# Patient Record
Sex: FEMALE | Race: WHITE | Employment: FULL TIME | URBAN - METROPOLITAN AREA
[De-identification: names, ages, dates, MRNs, and addresses within clinical notes are randomized per-mention and may not be internally consistent; named-entity substitution may affect disease eponyms.]

---

## 2017-01-24 ENCOUNTER — OFFICE VISIT (OUTPATIENT)
Dept: OBGYN CLINIC | Facility: CLINIC | Age: 27
End: 2017-01-24

## 2017-01-24 DIAGNOSIS — Z23 NEED FOR VACCINATION: Primary | ICD-10-CM

## 2017-01-24 PROCEDURE — 90651 9VHPV VACCINE 2/3 DOSE IM: CPT | Performed by: OBSTETRICS & GYNECOLOGY

## 2017-01-24 PROCEDURE — 90471 IMMUNIZATION ADMIN: CPT | Performed by: OBSTETRICS & GYNECOLOGY

## 2017-04-21 ENCOUNTER — OFFICE VISIT (OUTPATIENT)
Dept: FAMILY MEDICINE CLINIC | Facility: CLINIC | Age: 27
End: 2017-04-21

## 2017-04-21 VITALS
TEMPERATURE: 98 F | DIASTOLIC BLOOD PRESSURE: 78 MMHG | BODY MASS INDEX: 30 KG/M2 | SYSTOLIC BLOOD PRESSURE: 116 MMHG | RESPIRATION RATE: 14 BRPM | OXYGEN SATURATION: 98 % | HEART RATE: 82 BPM | WEIGHT: 180.38 LBS

## 2017-04-21 DIAGNOSIS — E66.9 NON MORBID OBESITY, UNSPECIFIED OBESITY TYPE: Primary | ICD-10-CM

## 2017-04-21 PROCEDURE — 99214 OFFICE O/P EST MOD 30 MIN: CPT | Performed by: FAMILY MEDICINE

## 2017-04-21 RX ORDER — PHENTERMINE HYDROCHLORIDE 37.5 MG/1
TABLET ORAL
Qty: 30 TABLET | Refills: 0 | Status: SHIPPED | OUTPATIENT
Start: 2017-04-21 | End: 2017-05-19

## 2017-04-21 NOTE — PROGRESS NOTES
CC:  Obesity    HPI:     Location abdomen and arms  Severity moderate  Duration chronic  Context worse since 27years of age    ROS;   GENERAL HEALTH: feels well otherwise  SKIN: denies any unusual skin lesions or rashes  RESPIRATORY: denies shortness of b

## 2017-05-05 ENCOUNTER — OFFICE VISIT (OUTPATIENT)
Dept: FAMILY MEDICINE CLINIC | Facility: CLINIC | Age: 27
End: 2017-05-05

## 2017-05-05 VITALS
DIASTOLIC BLOOD PRESSURE: 60 MMHG | RESPIRATION RATE: 16 BRPM | HEIGHT: 65 IN | HEART RATE: 84 BPM | OXYGEN SATURATION: 98 % | BODY MASS INDEX: 29.22 KG/M2 | TEMPERATURE: 98 F | WEIGHT: 175.38 LBS | SYSTOLIC BLOOD PRESSURE: 92 MMHG

## 2017-05-05 DIAGNOSIS — E66.9 NON MORBID OBESITY, UNSPECIFIED OBESITY TYPE: Primary | ICD-10-CM

## 2017-05-05 DIAGNOSIS — Z79.899 HIGH RISK MEDICATION USE: ICD-10-CM

## 2017-05-05 PROCEDURE — 99213 OFFICE O/P EST LOW 20 MIN: CPT | Performed by: FAMILY MEDICINE

## 2017-05-05 NOTE — PROGRESS NOTES
CC: medication follow up    HPI:     The patient has done well in first 2 weeks. Cravings and appetite reduced. Weight loss of 5 pounds.      ROS: No cp or sob, some initial nausea but resolved    EXAM:   BP 92/60 mmHg  Pulse 84  Temp(Src) 98.2 °F (36.8 °C)

## 2017-05-19 ENCOUNTER — OFFICE VISIT (OUTPATIENT)
Dept: FAMILY MEDICINE CLINIC | Facility: CLINIC | Age: 27
End: 2017-05-19

## 2017-05-19 VITALS
HEART RATE: 90 BPM | WEIGHT: 172.13 LBS | DIASTOLIC BLOOD PRESSURE: 60 MMHG | TEMPERATURE: 98 F | RESPIRATION RATE: 16 BRPM | HEIGHT: 65 IN | OXYGEN SATURATION: 96 % | BODY MASS INDEX: 28.68 KG/M2 | SYSTOLIC BLOOD PRESSURE: 100 MMHG

## 2017-05-19 DIAGNOSIS — E66.9 NON MORBID OBESITY, UNSPECIFIED OBESITY TYPE: Primary | ICD-10-CM

## 2017-05-19 PROCEDURE — 99213 OFFICE O/P EST LOW 20 MIN: CPT | Performed by: FAMILY MEDICINE

## 2017-05-19 RX ORDER — PHENTERMINE HYDROCHLORIDE 37.5 MG/1
TABLET ORAL
Qty: 30 TABLET | Refills: 0 | Status: SHIPPED | OUTPATIENT
Start: 2017-05-19 | End: 2017-06-16

## 2017-05-19 NOTE — PROGRESS NOTES
CC: meds check    HPI:     Feeling well. Losing weight. No palpitations.      ROS: no edema    EXAM:   /60 mmHg  Pulse 90  Temp(Src) 98.4 °F (36.9 °C) (Temporal)  Resp 16  Ht 65\"  Wt 172 lb 2 oz  BMI 28.64 kg/m2  SpO2 96%  LMP 04/13/2017    H: RRR

## 2017-05-26 ENCOUNTER — TELEPHONE (OUTPATIENT)
Dept: OBGYN CLINIC | Facility: CLINIC | Age: 27
End: 2017-05-26

## 2017-05-26 NOTE — TELEPHONE ENCOUNTER
Left message on patient's V/M stating she missed her gardasil shot today. To call and resched her appt.

## 2017-06-16 ENCOUNTER — OFFICE VISIT (OUTPATIENT)
Dept: FAMILY MEDICINE CLINIC | Facility: CLINIC | Age: 27
End: 2017-06-16

## 2017-06-16 VITALS
SYSTOLIC BLOOD PRESSURE: 127 MMHG | BODY MASS INDEX: 28.51 KG/M2 | HEIGHT: 64 IN | TEMPERATURE: 98 F | DIASTOLIC BLOOD PRESSURE: 68 MMHG | OXYGEN SATURATION: 98 % | HEART RATE: 72 BPM | WEIGHT: 167 LBS | RESPIRATION RATE: 14 BRPM

## 2017-06-16 DIAGNOSIS — E66.3 OVERWEIGHT: Primary | ICD-10-CM

## 2017-06-16 PROCEDURE — 99213 OFFICE O/P EST LOW 20 MIN: CPT | Performed by: FAMILY MEDICINE

## 2017-06-16 RX ORDER — PHENTERMINE HYDROCHLORIDE 37.5 MG/1
TABLET ORAL
Qty: 30 TABLET | Refills: 0 | Status: SHIPPED | OUTPATIENT
Start: 2017-06-16 | End: 2017-07-18

## 2017-06-16 NOTE — PROGRESS NOTES
CC: follow up weight loss    HPI:     Overall has lost 5 more pounds. Diet is good. Walking for exercise.      ROS: no cp or sob, no palpitations    EXAM:   /68 mmHg  Pulse 72  Temp(Src) 98.1 °F (36.7 °C) (Temporal)  Resp 14  Ht 64\"  Wt 167 lb  BMI 2

## 2017-07-18 ENCOUNTER — OFFICE VISIT (OUTPATIENT)
Dept: FAMILY MEDICINE CLINIC | Facility: CLINIC | Age: 27
End: 2017-07-18

## 2017-07-18 VITALS
BODY MASS INDEX: 27.19 KG/M2 | DIASTOLIC BLOOD PRESSURE: 60 MMHG | HEART RATE: 80 BPM | SYSTOLIC BLOOD PRESSURE: 102 MMHG | RESPIRATION RATE: 16 BRPM | WEIGHT: 163.19 LBS | TEMPERATURE: 98 F | HEIGHT: 65 IN

## 2017-07-18 DIAGNOSIS — E66.3 OVERWEIGHT: Primary | ICD-10-CM

## 2017-07-18 DIAGNOSIS — Z79.899 HIGH RISK MEDICATION USE: ICD-10-CM

## 2017-07-18 PROCEDURE — 99214 OFFICE O/P EST MOD 30 MIN: CPT | Performed by: FAMILY MEDICINE

## 2017-07-18 RX ORDER — PHENTERMINE HYDROCHLORIDE 37.5 MG/1
TABLET ORAL
Qty: 30 TABLET | Refills: 0 | Status: SHIPPED | OUTPATIENT
Start: 2017-07-18 | End: 2017-08-18

## 2017-07-18 NOTE — PROGRESS NOTES
CC: follow up     HPI:     Overall doing great. Exercising with walking and playing with the kids. Diet has not been the best but she is still losing weight. No negative side effects. Compliant. Weight loss of 4 pounds in 4 weeks.      ROS: no mood changes

## 2017-08-18 ENCOUNTER — OFFICE VISIT (OUTPATIENT)
Dept: FAMILY MEDICINE CLINIC | Facility: CLINIC | Age: 27
End: 2017-08-18

## 2017-08-18 VITALS
DIASTOLIC BLOOD PRESSURE: 64 MMHG | HEART RATE: 92 BPM | SYSTOLIC BLOOD PRESSURE: 110 MMHG | HEIGHT: 65 IN | WEIGHT: 159.25 LBS | OXYGEN SATURATION: 98 % | TEMPERATURE: 99 F | RESPIRATION RATE: 16 BRPM | BODY MASS INDEX: 26.53 KG/M2

## 2017-08-18 DIAGNOSIS — Z79.899 HIGH RISK MEDICATION USE: ICD-10-CM

## 2017-08-18 DIAGNOSIS — E66.3 OVERWEIGHT: Primary | ICD-10-CM

## 2017-08-18 PROCEDURE — 99214 OFFICE O/P EST MOD 30 MIN: CPT | Performed by: FAMILY MEDICINE

## 2017-08-18 RX ORDER — PHENTERMINE HYDROCHLORIDE 37.5 MG/1
TABLET ORAL
Qty: 30 TABLET | Refills: 0 | Status: SHIPPED | OUTPATIENT
Start: 2017-08-18 | End: 2017-09-29 | Stop reason: DRUGHIGH

## 2017-08-18 NOTE — PROGRESS NOTES
CC: follow up weight loss    HPI:     Overall she has continued to lose weight. The diet was not the best. Some mood fluctuations related primarily to home life situations. Is exercising by walking.  The overweight is a chronic issue, moderate in severity

## 2017-09-15 ENCOUNTER — OFFICE VISIT (OUTPATIENT)
Dept: FAMILY MEDICINE CLINIC | Facility: CLINIC | Age: 27
End: 2017-09-15

## 2017-09-15 VITALS
HEART RATE: 80 BPM | BODY MASS INDEX: 26 KG/M2 | RESPIRATION RATE: 16 BRPM | WEIGHT: 158 LBS | TEMPERATURE: 98 F | DIASTOLIC BLOOD PRESSURE: 62 MMHG | SYSTOLIC BLOOD PRESSURE: 104 MMHG

## 2017-09-15 DIAGNOSIS — Z79.899 HIGH RISK MEDICATION USE: ICD-10-CM

## 2017-09-15 DIAGNOSIS — E66.3 OVERWEIGHT: Primary | ICD-10-CM

## 2017-09-15 PROCEDURE — 99213 OFFICE O/P EST LOW 20 MIN: CPT | Performed by: FAMILY MEDICINE

## 2017-09-15 NOTE — PROGRESS NOTES
CC: follow up     HPI:     Overall the weight loss has really tapered off.      ROS: some mood irritability    EXAM:     /62   Pulse 80   Temp 98 °F (36.7 °C) (Temporal)   Resp 16   Wt 158 lb   LMP 08/19/2017 (Exact Date)   BMI 26.29 kg/m²     H: RRR

## 2017-09-29 ENCOUNTER — TELEPHONE (OUTPATIENT)
Dept: FAMILY MEDICINE CLINIC | Facility: CLINIC | Age: 27
End: 2017-09-29

## 2017-09-29 ENCOUNTER — OFFICE VISIT (OUTPATIENT)
Dept: FAMILY MEDICINE CLINIC | Facility: CLINIC | Age: 27
End: 2017-09-29

## 2017-09-29 VITALS
OXYGEN SATURATION: 98 % | TEMPERATURE: 98 F | RESPIRATION RATE: 12 BRPM | DIASTOLIC BLOOD PRESSURE: 54 MMHG | HEIGHT: 65 IN | HEART RATE: 85 BPM | WEIGHT: 157.25 LBS | SYSTOLIC BLOOD PRESSURE: 98 MMHG | BODY MASS INDEX: 26.2 KG/M2

## 2017-09-29 DIAGNOSIS — E66.3 OVERWEIGHT: Primary | ICD-10-CM

## 2017-09-29 DIAGNOSIS — Z79.899 HIGH RISK MEDICATION USE: ICD-10-CM

## 2017-09-29 PROCEDURE — 99213 OFFICE O/P EST LOW 20 MIN: CPT | Performed by: FAMILY MEDICINE

## 2017-09-29 NOTE — TELEPHONE ENCOUNTER
Pt advised and verbalized understanding. Patient states she will call when her medication is running out. Patient would like hard script for pharmacy.

## 2017-09-29 NOTE — PROGRESS NOTES
CC: follow up     HPI:     Overall the mood is better than previous. She did lose additional 1 pound in 2 weeks. Is getting more cravings. Is developing a strategy of meal planning. Discussed snacks.      ROS: no cp or sob    EXAM:   BP 98/54 (BP Location:

## 2017-09-29 NOTE — TELEPHONE ENCOUNTER
Pt was asked to find out if Phentermine HCl 8 MG Oral Tab is still available and it is not. And the phentermine 15Mg only coming in cap form.

## 2017-10-10 ENCOUNTER — TELEPHONE (OUTPATIENT)
Dept: FAMILY MEDICINE CLINIC | Facility: CLINIC | Age: 27
End: 2017-10-10

## 2017-10-10 NOTE — TELEPHONE ENCOUNTER
Patient informed rx ready for . Patient notified of clinic hours tomorrow. Patient verbalized understanding.

## 2017-10-10 NOTE — TELEPHONE ENCOUNTER
LOV: 9/29/17 for overweight    Phentermine HCl 15 MG Oral Tablet Dispersible 30 tablet 0 9/15/2017 10/15/2017   Sig :  Take 1 capsule by mouth daily.      Route:   Oral       Future Appointments  Date Time Provider Alanis Bahena   11/3/2017 11:00 AM Erik Pisano

## 2017-11-03 ENCOUNTER — OFFICE VISIT (OUTPATIENT)
Dept: FAMILY MEDICINE CLINIC | Facility: CLINIC | Age: 27
End: 2017-11-03

## 2017-11-03 VITALS
TEMPERATURE: 98 F | RESPIRATION RATE: 12 BRPM | BODY MASS INDEX: 25.66 KG/M2 | WEIGHT: 154 LBS | DIASTOLIC BLOOD PRESSURE: 58 MMHG | OXYGEN SATURATION: 98 % | SYSTOLIC BLOOD PRESSURE: 100 MMHG | HEART RATE: 84 BPM | HEIGHT: 65 IN

## 2017-11-03 DIAGNOSIS — Z79.899 HIGH RISK MEDICATION USE: ICD-10-CM

## 2017-11-03 DIAGNOSIS — E66.3 OVERWEIGHT: Primary | ICD-10-CM

## 2017-11-03 PROCEDURE — 99213 OFFICE O/P EST LOW 20 MIN: CPT | Performed by: FAMILY MEDICINE

## 2017-11-03 RX ORDER — PHENTERMINE HYDROCHLORIDE 15 MG/1
15 CAPSULE ORAL EVERY MORNING
Qty: 30 CAPSULE | Refills: 0 | Status: SHIPPED | OUTPATIENT
Start: 2017-11-03 | End: 2018-07-26 | Stop reason: ALTCHOICE

## 2017-11-03 NOTE — PROGRESS NOTES
CC: follow up weight loss    HPI:     Has continued to lose weight. She feels well. Is avoiding food tv shows. ROS: no cp or palpitations    EXAM;   Blood pressure 100/58, pulse 84, temperature 98.2 °F (36.8 °C), temperature source Temporal, resp.  rate

## 2018-01-05 ENCOUNTER — OFFICE VISIT (OUTPATIENT)
Dept: FAMILY MEDICINE CLINIC | Facility: CLINIC | Age: 28
End: 2018-01-05

## 2018-01-05 VITALS
DIASTOLIC BLOOD PRESSURE: 64 MMHG | OXYGEN SATURATION: 98 % | SYSTOLIC BLOOD PRESSURE: 98 MMHG | HEART RATE: 89 BPM | HEIGHT: 65 IN | BODY MASS INDEX: 26.51 KG/M2 | TEMPERATURE: 98 F | RESPIRATION RATE: 12 BRPM | WEIGHT: 159.13 LBS

## 2018-01-05 DIAGNOSIS — E66.3 OVERWEIGHT: Primary | ICD-10-CM

## 2018-01-05 PROCEDURE — 99213 OFFICE O/P EST LOW 20 MIN: CPT | Performed by: FAMILY MEDICINE

## 2018-01-05 NOTE — PROGRESS NOTES
CC: follow up weight issues    HPI:     Overall doing ok. Increase in hunger and did gain a small amount of weight again.      ROS: no cp or sob, no palpitations      EXAM:    BP 98/64 (BP Location: Left arm, Patient Position: Sitting, Cuff Size: adult)   P

## 2018-03-13 NOTE — TELEPHONE ENCOUNTER
LOV: 1/5/18 for overweight    Butalbital-APAP-Caffeine -40 MG Oral Tab 30 tablet 0 11/4/2016    Sig :  Take 1 tablet by mouth every 4 (four) hours as needed for Headaches. Route:   Oral     PRN Reason(s):   Headaches       No future appointments.

## 2018-03-16 RX ORDER — BUTALBITAL, ACETAMINOPHEN AND CAFFEINE 50; 325; 40 MG/1; MG/1; MG/1
TABLET ORAL
Qty: 30 TABLET | Refills: 0 | Status: SHIPPED | OUTPATIENT
Start: 2018-03-16 | End: 2021-11-01

## 2018-06-19 ENCOUNTER — TELEPHONE (OUTPATIENT)
Dept: FAMILY MEDICINE CLINIC | Facility: CLINIC | Age: 28
End: 2018-06-19

## 2018-06-19 DIAGNOSIS — R51.9 CHRONIC INTRACTABLE HEADACHE, UNSPECIFIED HEADACHE TYPE: Primary | ICD-10-CM

## 2018-06-19 DIAGNOSIS — G89.29 CHRONIC INTRACTABLE HEADACHE, UNSPECIFIED HEADACHE TYPE: Primary | ICD-10-CM

## 2018-06-19 NOTE — TELEPHONE ENCOUNTER
Pt called stated she was wondering if Dr. Whit Kamara could recommend her to a neurologist for HA's. Pt also was wondering if Dr. Whit Kamara would put her on phentermine again.

## 2018-06-19 NOTE — TELEPHONE ENCOUNTER
Referral placed for neuro - Dr. Amrita Middleton  Patient notified that we are not prescribing phentermine. No future appointments.

## 2018-06-19 NOTE — TELEPHONE ENCOUNTER
Francis Bragg for neurologist referral. Will not be prescribing phentermine any more. Refer patient to weight loss clinic.

## 2018-07-26 ENCOUNTER — OFFICE VISIT (OUTPATIENT)
Dept: NEUROLOGY | Facility: CLINIC | Age: 28
End: 2018-07-26
Payer: MEDICAID

## 2018-07-26 VITALS
BODY MASS INDEX: 29.3 KG/M2 | SYSTOLIC BLOOD PRESSURE: 100 MMHG | RESPIRATION RATE: 16 BRPM | HEIGHT: 65.5 IN | WEIGHT: 178 LBS | OXYGEN SATURATION: 98 % | DIASTOLIC BLOOD PRESSURE: 60 MMHG | HEART RATE: 77 BPM

## 2018-07-26 DIAGNOSIS — G43.009 MIGRAINE WITHOUT AURA AND WITHOUT STATUS MIGRAINOSUS, NOT INTRACTABLE: Primary | ICD-10-CM

## 2018-07-26 PROCEDURE — 99204 OFFICE O/P NEW MOD 45 MIN: CPT | Performed by: OTHER

## 2018-07-26 RX ORDER — TOPIRAMATE 25 MG/1
25 TABLET ORAL 2 TIMES DAILY
Qty: 60 TABLET | Refills: 2 | Status: SHIPPED | OUTPATIENT
Start: 2018-07-26 | End: 2018-09-24 | Stop reason: ALTCHOICE

## 2018-07-26 RX ORDER — RIZATRIPTAN BENZOATE 10 MG/1
10 TABLET ORAL AS NEEDED
Qty: 12 TABLET | Refills: 0 | Status: SHIPPED | OUTPATIENT
Start: 2018-07-26 | End: 2018-08-25

## 2018-07-26 NOTE — PROGRESS NOTES
HPI:    Patient ID: Sujatha Combs is a 34year old female. PCP: Dr Marylee Collie    HPI     I had the pleasure of seeing Lili Thorne in consultation for headaches. She started having headaches 2 years ago and has progressed since then.  She is getting th Skin: Negative. Allergic/Immunologic: Negative. Neurological: Positive for headaches. Negative for dizziness, facial asymmetry, speech difficulty and numbness. Hematological: Negative. All other systems reviewed and are negative.            Cur presenting with common migraine headaches. She needs preventive medication, will start her on Topamax 25 mg BID and titrate up to 50 mg BID as tolerated. We will also start her Maxalt for abortive therapy.  Counseled on drug side effects and migraine trigge

## 2018-08-08 DIAGNOSIS — G43.009 MIGRAINE WITHOUT AURA AND WITHOUT STATUS MIGRAINOSUS, NOT INTRACTABLE: Primary | ICD-10-CM

## 2018-08-08 RX ORDER — TOPIRAMATE 50 MG/1
50 TABLET, FILM COATED ORAL 2 TIMES DAILY
Qty: 60 TABLET | Refills: 0 | Status: SHIPPED | OUTPATIENT
Start: 2018-08-08 | End: 2018-09-10

## 2018-08-08 RX ORDER — METHYLPREDNISOLONE 4 MG/1
TABLET ORAL
Qty: 1 PACKAGE | Refills: 0 | Status: SHIPPED | OUTPATIENT
Start: 2018-08-08 | End: 2018-09-24 | Stop reason: ALTCHOICE

## 2018-08-08 NOTE — TELEPHONE ENCOUNTER
S:  Headache \"off and on\" for one week. B:  7/26/18 OV note:  Nia Pérez presenting with common migraine headaches. She needs preventive medication, will start her on Topamax 25 mg BID and titrate up to 50 mg BID as tolerated.  We will also start her Maxalt

## 2018-08-16 NOTE — TELEPHONE ENCOUNTER
Spoke with [facility] with pharmacy and confirmed that patient filled both prescriptions.

## 2018-09-10 DIAGNOSIS — G43.009 MIGRAINE WITHOUT AURA AND WITHOUT STATUS MIGRAINOSUS, NOT INTRACTABLE: ICD-10-CM

## 2018-09-11 RX ORDER — TOPIRAMATE 50 MG/1
TABLET, FILM COATED ORAL
Qty: 60 TABLET | Refills: 0 | Status: SHIPPED | OUTPATIENT
Start: 2018-09-11 | End: 2018-09-24

## 2018-09-24 ENCOUNTER — OFFICE VISIT (OUTPATIENT)
Dept: NEUROLOGY | Facility: CLINIC | Age: 28
End: 2018-09-24
Payer: MEDICAID

## 2018-09-24 VITALS
BODY MASS INDEX: 28 KG/M2 | RESPIRATION RATE: 14 BRPM | DIASTOLIC BLOOD PRESSURE: 60 MMHG | HEART RATE: 70 BPM | WEIGHT: 168 LBS | SYSTOLIC BLOOD PRESSURE: 104 MMHG

## 2018-09-24 DIAGNOSIS — G43.009 MIGRAINE WITHOUT AURA AND WITHOUT STATUS MIGRAINOSUS, NOT INTRACTABLE: ICD-10-CM

## 2018-09-24 PROCEDURE — 99213 OFFICE O/P EST LOW 20 MIN: CPT | Performed by: OTHER

## 2018-09-24 RX ORDER — TOPIRAMATE 50 MG/1
50 TABLET, FILM COATED ORAL 2 TIMES DAILY
Qty: 60 TABLET | Refills: 2 | Status: SHIPPED | OUTPATIENT
Start: 2018-09-24 | End: 2018-12-26

## 2018-09-24 NOTE — PATIENT INSTRUCTIONS
Refill policies:    • Allow 2-3 business days for refills; controlled substances may take longer.   • Contact your pharmacy at least 5 days prior to running out of medication and have them send an electronic request or submit request through the “request re entire amount billed. Precertification and Prior Authorizations: If your physician has recommended that you have a procedure or additional testing performed.   [facility]) will contact your insurance carrier to obtain pre-certi

## 2018-09-24 NOTE — PROGRESS NOTES
HPI:    Patient ID: Moraima Feldman is a 34year old female. PCP: Dr Allyson Coley    Headache    Associated symptoms include nausea and photophobia. Pertinent negatives include no dizziness or numbness. Patient presents for follow up.  Doing better and Genitourinary: Negative. Musculoskeletal: Negative. Skin: Negative. Allergic/Immunologic: Negative. Neurological: Positive for headaches. Negative for dizziness, facial asymmetry, speech difficulty and numbness. Hematological: Negative. casual gait         ASSESSMENT/PLAN:   (G43.009) Migraine without aura and without status migrainosus, not intractable  (primary encounter diagnosis)    Headaches improving.  Continue Topamax at same dose  Awaiting for MRI results  RTC in about 3-4 months

## 2018-09-24 NOTE — PROGRESS NOTES
Patient here for follow up on headaches, states are better after increasing topamax, not as severe, 1-2 per week.

## 2018-10-22 ENCOUNTER — TELEPHONE (OUTPATIENT)
Dept: NEUROLOGY | Facility: CLINIC | Age: 28
End: 2018-10-22

## 2018-10-22 NOTE — TELEPHONE ENCOUNTER
Spoke with patient, states has had a migraine for 7 days, comes/goes, also had 1 episode of vomiting yesterday. Currently on Topamax 50mg BID, is taking Rizatriptan and Advil as needed which does help temporarily however migraine does come back.      Inf

## 2018-10-23 RX ORDER — METHYLPREDNISOLONE 4 MG/1
TABLET ORAL
Qty: 1 PACKAGE | Refills: 0 | Status: SHIPPED | OUTPATIENT
Start: 2018-10-23 | End: 2019-01-25 | Stop reason: ALTCHOICE

## 2018-10-23 NOTE — TELEPHONE ENCOUNTER
Left message on patient identified voicemail (ok with HIPPA consent) informing patient MDP RX sent to pharmacy.

## 2018-12-26 DIAGNOSIS — G43.009 MIGRAINE WITHOUT AURA AND WITHOUT STATUS MIGRAINOSUS, NOT INTRACTABLE: ICD-10-CM

## 2018-12-27 RX ORDER — TOPIRAMATE 50 MG/1
TABLET, FILM COATED ORAL
Qty: 60 TABLET | Refills: 0 | Status: SHIPPED | OUTPATIENT
Start: 2018-12-27 | End: 2019-01-27

## 2019-01-25 ENCOUNTER — OFFICE VISIT (OUTPATIENT)
Dept: NEUROLOGY | Facility: CLINIC | Age: 29
End: 2019-01-25
Payer: MEDICAID

## 2019-01-25 VITALS
DIASTOLIC BLOOD PRESSURE: 70 MMHG | SYSTOLIC BLOOD PRESSURE: 118 MMHG | BODY MASS INDEX: 28.48 KG/M2 | HEIGHT: 65.5 IN | HEART RATE: 65 BPM | OXYGEN SATURATION: 99 % | RESPIRATION RATE: 16 BRPM | WEIGHT: 173 LBS

## 2019-01-25 DIAGNOSIS — G43.009 MIGRAINE WITHOUT AURA AND WITHOUT STATUS MIGRAINOSUS, NOT INTRACTABLE: Primary | ICD-10-CM

## 2019-01-25 DIAGNOSIS — F41.9 ANXIETY: ICD-10-CM

## 2019-01-25 PROCEDURE — 99213 OFFICE O/P EST LOW 20 MIN: CPT | Performed by: OTHER

## 2019-01-25 RX ORDER — VENLAFAXINE HYDROCHLORIDE 37.5 MG/1
37.5 CAPSULE, EXTENDED RELEASE ORAL DAILY
Qty: 1 CAPSULE | Refills: 2 | Status: SHIPPED | OUTPATIENT
Start: 2019-01-25 | End: 2019-04-22

## 2019-01-25 NOTE — PROGRESS NOTES
HPI:    Patient ID: Blanco Doe is a 34year old female. Headache    Associated symptoms include nausea and photophobia. Pertinent negatives include no dizziness or numbness. Patient presents for follow up.  Doing better and had very l Negative. Cardiovascular: Negative. Gastrointestinal: Positive for nausea. Endocrine: Negative. Genitourinary: Negative. Musculoskeletal: Negative. Skin: Negative. Allergic/Immunologic: Negative. Neurological: Positive for headaches. lateral movements. Sensory : Intact to all modalities including light touch, pinprick, vibration and proprioception  Motor: Normal tone and bulk in all extremities.  Strength is 5/5 in all muscle groups  Reflexes: symmetric and present  Coordination: Intac

## 2019-01-27 DIAGNOSIS — G43.009 MIGRAINE WITHOUT AURA AND WITHOUT STATUS MIGRAINOSUS, NOT INTRACTABLE: ICD-10-CM

## 2019-01-28 RX ORDER — TOPIRAMATE 50 MG/1
TABLET, FILM COATED ORAL
Qty: 60 TABLET | Refills: 2 | Status: SHIPPED | OUTPATIENT
Start: 2019-01-28 | End: 2019-04-26

## 2019-04-22 DIAGNOSIS — G43.009 MIGRAINE WITHOUT AURA AND WITHOUT STATUS MIGRAINOSUS, NOT INTRACTABLE: Primary | ICD-10-CM

## 2019-04-22 RX ORDER — VENLAFAXINE HYDROCHLORIDE 37.5 MG/1
CAPSULE, EXTENDED RELEASE ORAL
Qty: 30 CAPSULE | Refills: 0 | Status: CANCELLED | OUTPATIENT
Start: 2019-04-22

## 2019-04-23 RX ORDER — VENLAFAXINE HYDROCHLORIDE 37.5 MG/1
CAPSULE, EXTENDED RELEASE ORAL
Qty: 30 CAPSULE | Refills: 0 | Status: SHIPPED | OUTPATIENT
Start: 2019-04-23 | End: 2019-04-26

## 2019-04-26 ENCOUNTER — OFFICE VISIT (OUTPATIENT)
Dept: NEUROLOGY | Facility: CLINIC | Age: 29
End: 2019-04-26
Payer: MEDICAID

## 2019-04-26 VITALS
HEART RATE: 70 BPM | RESPIRATION RATE: 14 BRPM | SYSTOLIC BLOOD PRESSURE: 102 MMHG | DIASTOLIC BLOOD PRESSURE: 72 MMHG | BODY MASS INDEX: 27 KG/M2 | WEIGHT: 163 LBS

## 2019-04-26 DIAGNOSIS — G43.009 MIGRAINE WITHOUT AURA AND WITHOUT STATUS MIGRAINOSUS, NOT INTRACTABLE: ICD-10-CM

## 2019-04-26 PROCEDURE — 99213 OFFICE O/P EST LOW 20 MIN: CPT | Performed by: OTHER

## 2019-04-26 RX ORDER — VENLAFAXINE HYDROCHLORIDE 37.5 MG/1
37.5 CAPSULE, EXTENDED RELEASE ORAL
Qty: 90 CAPSULE | Refills: 1 | Status: SHIPPED | OUTPATIENT
Start: 2019-04-26 | End: 2019-10-29

## 2019-04-26 RX ORDER — TOPIRAMATE 50 MG/1
TABLET, FILM COATED ORAL
Qty: 180 TABLET | Refills: 1 | Status: SHIPPED | OUTPATIENT
Start: 2019-04-26 | End: 2019-10-29

## 2019-04-26 NOTE — PROGRESS NOTES
Pt states no increase in migraines. Pt states she may have one migraine a month, but not every month.

## 2019-04-26 NOTE — PROGRESS NOTES
HPI:    Patient ID: Avinash Torres is a 34year old female. Headache    Pertinent negatives include no dizziness, nausea, numbness or photophobia. Patient presents for follow up for migraines.   She noticed significant improvement in the Cardiovascular: Negative. Gastrointestinal: Negative for nausea. Endocrine: Negative. Genitourinary: Negative. Musculoskeletal: Negative. Skin: Negative. Allergic/Immunologic: Negative.     Neurological: Negative for dizziness, facial asy months  See orders and medications filed with this encounter. The patient indicates understanding of these issues and agrees with the plan.       Abel Pop MD  [facility] This Visit:  Requested Prescriptions     Signed

## 2019-08-19 ENCOUNTER — HOSPITAL ENCOUNTER (OUTPATIENT)
Age: 29
Discharge: HOME | End: 2019-08-19
Attending: FAMILY MEDICINE
Payer: MEDICAID

## 2019-08-19 VITALS
DIASTOLIC BLOOD PRESSURE: 70 MMHG | WEIGHT: 166 LBS | RESPIRATION RATE: 16 BRPM | TEMPERATURE: 98 F | BODY MASS INDEX: 27 KG/M2 | SYSTOLIC BLOOD PRESSURE: 97 MMHG | HEART RATE: 77 BPM | OXYGEN SATURATION: 98 %

## 2019-08-19 DIAGNOSIS — J06.9 ACUTE URI: Primary | ICD-10-CM

## 2019-08-19 LAB — POCT RAPID STREP: NEGATIVE

## 2019-08-19 PROCEDURE — 87081 CULTURE SCREEN ONLY: CPT | Performed by: FAMILY MEDICINE

## 2019-08-19 PROCEDURE — 87430 STREP A AG IA: CPT | Performed by: FAMILY MEDICINE

## 2019-08-19 PROCEDURE — 99214 OFFICE O/P EST MOD 30 MIN: CPT

## 2019-08-19 RX ORDER — BENZONATATE 100 MG/1
100 CAPSULE ORAL 3 TIMES DAILY PRN
Qty: 21 CAPSULE | Refills: 0 | Status: SHIPPED | OUTPATIENT
Start: 2019-08-19 | End: 2019-08-26

## 2019-08-19 NOTE — ED PROVIDER NOTES
Patient Seen in: [address] [facility]    History   Patient presents with:  Cough    Stated Complaint: coughing     HPI    82-year-old female presents with complaints of sore throat, cough for the past 2 days.   She reports cough to be dry at ti exudates.   Neck supple full range of motion, small anterior cervical lymphadenopathy bilaterally  Lungs clear to auscultation bilaterally  Heart S1-S2 heard no murmurs no gallops  Skin shows no rash        ED Course     Labs Reviewed   POCT RAPID STREP - N

## 2019-08-21 NOTE — ED NOTES
Left message to return call for lab (strep culture) result. [facility]   Order: [id number]   Collected:  8/19/2019 08:56   Status:  Final result   Specimen Information: Throat;  Other        STREP A CULTURE No Beta Hemolytic Strep Isolated

## 2019-10-29 ENCOUNTER — OFFICE VISIT (OUTPATIENT)
Dept: NEUROLOGY | Facility: CLINIC | Age: 29
End: 2019-10-29
Payer: MEDICAID

## 2019-10-29 VITALS
RESPIRATION RATE: 16 BRPM | BODY MASS INDEX: 27 KG/M2 | SYSTOLIC BLOOD PRESSURE: 100 MMHG | HEART RATE: 72 BPM | DIASTOLIC BLOOD PRESSURE: 72 MMHG | WEIGHT: 166 LBS

## 2019-10-29 DIAGNOSIS — G43.009 MIGRAINE WITHOUT AURA AND WITHOUT STATUS MIGRAINOSUS, NOT INTRACTABLE: ICD-10-CM

## 2019-10-29 PROCEDURE — 99213 OFFICE O/P EST LOW 20 MIN: CPT | Performed by: OTHER

## 2019-10-29 RX ORDER — VENLAFAXINE HYDROCHLORIDE 37.5 MG/1
37.5 CAPSULE, EXTENDED RELEASE ORAL
Qty: 90 CAPSULE | Refills: 1 | Status: SHIPPED | OUTPATIENT
Start: 2019-10-29 | End: 2020-05-06

## 2019-10-29 RX ORDER — TOPIRAMATE 50 MG/1
TABLET, FILM COATED ORAL
Qty: 180 TABLET | Refills: 1 | Status: SHIPPED | OUTPATIENT
Start: 2019-10-29 | End: 2020-05-06

## 2019-10-29 NOTE — PROGRESS NOTES
HPI:    Patient ID: Sweta Olivera is a 34year old female. Headache    Pertinent negatives include no dizziness, nausea, numbness or photophobia. Patient presents for follow up for migraines and for medication refills.  States migraines systems reviewed and are negative. Venlafaxine HCl ER 37.5 MG Oral Capsule SR 24 Hr, Take 1 capsule (37.5 mg total) by mouth once daily. , Disp: 90 capsule, Rfl: 1  topiramate 50 MG Oral Tab, TAKE 1 TABLET(50 MG) BY MOUTH TWICE DAILY, Disp: 180 ta 1 capsule (37.5 mg total) by mouth once daily.    • topiramate 50 MG Oral Tab 180 tablet 1     Sig: TAKE 1 TABLET(50 MG) BY MOUTH TWICE DAILY       Imaging & Referrals:  None     QW#[id number]

## 2019-12-09 ENCOUNTER — HOSPITAL ENCOUNTER (OUTPATIENT)
Age: 29
Discharge: HOME | End: 2019-12-09
Attending: FAMILY MEDICINE
Payer: MEDICAID

## 2019-12-09 VITALS
HEART RATE: 92 BPM | DIASTOLIC BLOOD PRESSURE: 69 MMHG | SYSTOLIC BLOOD PRESSURE: 98 MMHG | RESPIRATION RATE: 16 BRPM | TEMPERATURE: 98 F | OXYGEN SATURATION: 99 %

## 2019-12-09 DIAGNOSIS — B34.9 VIRAL SYNDROME: Primary | ICD-10-CM

## 2019-12-09 PROCEDURE — 99214 OFFICE O/P EST MOD 30 MIN: CPT

## 2019-12-09 PROCEDURE — 87086 URINE CULTURE/COLONY COUNT: CPT | Performed by: FAMILY MEDICINE

## 2019-12-09 PROCEDURE — 87502 INFLUENZA DNA AMP PROBE: CPT | Performed by: FAMILY MEDICINE

## 2019-12-09 PROCEDURE — 99213 OFFICE O/P EST LOW 20 MIN: CPT

## 2019-12-09 PROCEDURE — 81002 URINALYSIS NONAUTO W/O SCOPE: CPT | Performed by: FAMILY MEDICINE

## 2019-12-09 PROCEDURE — 81025 URINE PREGNANCY TEST: CPT | Performed by: FAMILY MEDICINE

## 2019-12-09 NOTE — ED PROVIDER NOTES
Patient Seen in: [address] [facility]      History   Patient presents with:  Cough/URI  Fever  Body ache and/or chills    Stated Complaint: cough,congestion, body aches, sore throat    HPI    53-year-old female presents today with complaints o Reviewed   POCT URINALYSIS DIPSTICK - Abnormal; Notable for the following components:       Result Value    Urine Clarity Cloudy (*)     Blood, Urine Trace-Intact (*)     Leukocyte esterase urine Trace (*)     All other components within normal limits   PO

## 2020-03-26 ENCOUNTER — TELEPHONE (OUTPATIENT)
Dept: NEUROLOGY | Facility: CLINIC | Age: 30
End: 2020-03-26

## 2020-05-06 ENCOUNTER — VIRTUAL PHONE E/M (OUTPATIENT)
Dept: NEUROLOGY | Facility: CLINIC | Age: 30
End: 2020-05-06
Payer: MEDICAID

## 2020-05-06 DIAGNOSIS — G43.009 MIGRAINE WITHOUT AURA AND WITHOUT STATUS MIGRAINOSUS, NOT INTRACTABLE: Primary | ICD-10-CM

## 2020-05-06 PROCEDURE — 99213 OFFICE O/P EST LOW 20 MIN: CPT | Performed by: OTHER

## 2020-05-06 RX ORDER — VENLAFAXINE HYDROCHLORIDE 37.5 MG/1
37.5 CAPSULE, EXTENDED RELEASE ORAL
Qty: 90 CAPSULE | Refills: 1 | Status: SHIPPED | OUTPATIENT
Start: 2020-05-06 | End: 2020-11-17

## 2020-05-06 RX ORDER — TOPIRAMATE 50 MG/1
TABLET, FILM COATED ORAL
Qty: 180 TABLET | Refills: 1 | Status: SHIPPED | OUTPATIENT
Start: 2020-05-06 | End: 2020-11-17

## 2020-05-06 NOTE — PROGRESS NOTES
Virtual Telephone Check-In    Flora Rosenthal verbally consents a Virtual/Telephone Check-In visit on 05/06/20.     Patient understands and accepts financial responsibility for any deductible, co-insurance and/or co-pays associated with this service

## 2020-08-14 ENCOUNTER — VIRTUAL PHONE E/M (OUTPATIENT)
Dept: FAMILY MEDICINE CLINIC | Facility: CLINIC | Age: 30
End: 2020-08-14

## 2020-08-14 DIAGNOSIS — N89.8 VAGINAL ODOR: Primary | ICD-10-CM

## 2020-08-14 PROCEDURE — 99212 OFFICE O/P EST SF 10 MIN: CPT | Performed by: FAMILY MEDICINE

## 2020-08-14 NOTE — PROGRESS NOTES
My Chart/ Video/Telephone Visit Check-In Due to [address] called the on call service. Juan Pablo Arluis verbally consents to a Telephone Check-In service on 08/14/20.   Patient understands and accepts financial respons ROS:  General: energy level stable, appetite fine  GI: Denies abdominal pain, constipation, diarrhea     Physical Exam:  General: No apparent distress when conversing with me  Psych: Alert and oriented x 3, speech was not pressured  Resp: No respirator

## 2020-09-04 ENCOUNTER — TELEPHONE (OUTPATIENT)
Dept: OBGYN CLINIC | Facility: CLINIC | Age: 30
End: 2020-09-04

## 2020-09-04 ENCOUNTER — OFFICE VISIT (OUTPATIENT)
Dept: OBGYN CLINIC | Facility: CLINIC | Age: 30
End: 2020-09-04
Payer: MEDICAID

## 2020-09-04 VITALS
SYSTOLIC BLOOD PRESSURE: 104 MMHG | DIASTOLIC BLOOD PRESSURE: 68 MMHG | HEART RATE: 86 BPM | BODY MASS INDEX: 28.12 KG/M2 | HEIGHT: 65 IN | WEIGHT: 168.81 LBS

## 2020-09-04 DIAGNOSIS — Z11.3 SCREEN FOR STD (SEXUALLY TRANSMITTED DISEASE): ICD-10-CM

## 2020-09-04 DIAGNOSIS — Z30.09 BIRTH CONTROL COUNSELING: ICD-10-CM

## 2020-09-04 DIAGNOSIS — Z01.419 WELL WOMAN EXAM WITH ROUTINE GYNECOLOGICAL EXAM: Primary | ICD-10-CM

## 2020-09-04 DIAGNOSIS — Z12.4 CERVICAL CANCER SCREENING: ICD-10-CM

## 2020-09-04 PROCEDURE — 88175 CYTOPATH C/V AUTO FLUID REDO: CPT | Performed by: NURSE PRACTITIONER

## 2020-09-04 PROCEDURE — 3008F BODY MASS INDEX DOCD: CPT | Performed by: NURSE PRACTITIONER

## 2020-09-04 PROCEDURE — 3078F DIAST BP <80 MM HG: CPT | Performed by: NURSE PRACTITIONER

## 2020-09-04 PROCEDURE — 99385 PREV VISIT NEW AGE 18-39: CPT | Performed by: NURSE PRACTITIONER

## 2020-09-04 PROCEDURE — 3074F SYST BP LT 130 MM HG: CPT | Performed by: NURSE PRACTITIONER

## 2020-09-04 NOTE — PROGRESS NOTES
Here for new gynecology visit. 31year old G 2 P 2. Patient's last menstrual period was 08/09/2020 (exact date). .     Here for Annual Gynecologic Exam. She is interested in having the Nexplanon placed. Menses Q 28 -30 days for 3-4 days.   Nothing noted joint swelling. Neurological:  No headaches, depression, anxiety, migraines, seizure disorders, behavioral problems.                /68   Pulse 86   Ht 65\"   Wt 168 lb 12.8 oz (76.6 kg)   LMP 08/09/2020 (Exact Date)   BMI 28.09 kg/m²     NECK:  Thyr

## 2020-09-10 NOTE — TELEPHONE ENCOUNTER
Nexplanon coverage details received; covered at 100% under buy and bill. Please contact pt to schedule appt.

## 2020-09-10 NOTE — TELEPHONE ENCOUNTER
Pt scheduled    Future Appointments   Date Time Provider Alanis Bahena   10/13/2020  9:30 AM [facility] [address]

## 2020-10-13 ENCOUNTER — OFFICE VISIT (OUTPATIENT)
Dept: OBGYN CLINIC | Facility: CLINIC | Age: 30
End: 2020-10-13
Payer: MEDICAID

## 2020-10-13 VITALS — DIASTOLIC BLOOD PRESSURE: 60 MMHG | SYSTOLIC BLOOD PRESSURE: 104 MMHG | WEIGHT: 170 LBS | BODY MASS INDEX: 28 KG/M2

## 2020-10-13 DIAGNOSIS — Z30.017 INSERTION OF IMPLANTABLE SUBDERMAL CONTRACEPTIVE: Primary | ICD-10-CM

## 2020-10-13 DIAGNOSIS — Z01.812 PRE-PROCEDURAL LABORATORY EXAMINATION: ICD-10-CM

## 2020-10-13 PROCEDURE — 11981 INSERTION DRUG DLVR IMPLANT: CPT | Performed by: NURSE PRACTITIONER

## 2020-10-13 PROCEDURE — 3074F SYST BP LT 130 MM HG: CPT | Performed by: NURSE PRACTITIONER

## 2020-10-13 PROCEDURE — 81025 URINE PREGNANCY TEST: CPT | Performed by: NURSE PRACTITIONER

## 2020-10-13 PROCEDURE — 3078F DIAST BP <80 MM HG: CPT | Performed by: NURSE PRACTITIONER

## 2020-10-13 NOTE — PROGRESS NOTES
S:  Patient was counseled previously on Nexplanon and has decided to proceed with placement . All questions were answered, risks and benefits were discussed.      O:  The site was marked in the bicubital fossa in the left arm, 8-12 cm proximal to the elbow

## 2020-10-27 ENCOUNTER — OFFICE VISIT (OUTPATIENT)
Dept: OBGYN CLINIC | Facility: CLINIC | Age: 30
End: 2020-10-27
Payer: MEDICAID

## 2020-10-27 VITALS
HEIGHT: 65 IN | WEIGHT: 171.81 LBS | BODY MASS INDEX: 28.62 KG/M2 | DIASTOLIC BLOOD PRESSURE: 62 MMHG | HEART RATE: 94 BPM | SYSTOLIC BLOOD PRESSURE: 108 MMHG

## 2020-10-27 DIAGNOSIS — Z30.46 ENCOUNTER FOR SURVEILLANCE OF NEXPLANON SUBDERMAL CONTRACEPTIVE: Primary | ICD-10-CM

## 2020-10-27 PROCEDURE — 3008F BODY MASS INDEX DOCD: CPT | Performed by: NURSE PRACTITIONER

## 2020-10-27 PROCEDURE — 3074F SYST BP LT 130 MM HG: CPT | Performed by: NURSE PRACTITIONER

## 2020-10-27 PROCEDURE — 99212 OFFICE O/P EST SF 10 MIN: CPT | Performed by: NURSE PRACTITIONER

## 2020-10-27 PROCEDURE — 3078F DIAST BP <80 MM HG: CPT | Performed by: NURSE PRACTITIONER

## 2020-10-27 RX ORDER — ETONOGESTREL 68 MG/1
IMPLANT SUBCUTANEOUS
COMMUNITY
End: 2021-04-14 | Stop reason: ALTCHOICE

## 2020-10-27 NOTE — PROGRESS NOTES
Erick note     S: patient is a 31year old yo  here for a follow up after having a Nexplanon device placed. She denies any pain, numbness or tingling in her arm. She also denies any side effects or abnormal bleeding at this time.  Her bruising has sub

## 2020-11-13 ENCOUNTER — TELEPHONE (OUTPATIENT)
Dept: NEUROLOGY | Facility: CLINIC | Age: 30
End: 2020-11-13

## 2020-11-17 DIAGNOSIS — G43.009 MIGRAINE WITHOUT AURA AND WITHOUT STATUS MIGRAINOSUS, NOT INTRACTABLE: ICD-10-CM

## 2020-11-17 RX ORDER — VENLAFAXINE HYDROCHLORIDE 37.5 MG/1
CAPSULE, EXTENDED RELEASE ORAL
Qty: 90 CAPSULE | Refills: 0 | Status: SHIPPED | OUTPATIENT
Start: 2020-11-17 | End: 2021-03-03

## 2020-11-17 RX ORDER — TOPIRAMATE 50 MG/1
TABLET, FILM COATED ORAL
Qty: 180 TABLET | Refills: 0 | Status: SHIPPED | OUTPATIENT
Start: 2020-11-17 | End: 2021-03-03

## 2020-11-17 NOTE — TELEPHONE ENCOUNTER
Sent the patient a [employer] message to make an appt for further medication refills.      Medication: VENLAFAXINE HCL ER 37.5 MG Oral Capsule SR 24 Hr + TOPIRAMATE 50 MG Oral Tab    Date of last refill: 05/06/2020 (#90/1) + 05/06/2020 (180/1)  Date last fille

## 2020-12-16 ENCOUNTER — TELEPHONE (OUTPATIENT)
Dept: OBGYN CLINIC | Facility: CLINIC | Age: 30
End: 2020-12-16

## 2020-12-16 NOTE — TELEPHONE ENCOUNTER
30 y/o stated that she went to [facility] with boyfriend yesterday and he tested positive for gonorrhea. Patient was supposed to be tested on 09/04 but the lab did not run GC/CL off of pap. She denies any discharge or s/s.  She was wondering if she could be t

## 2020-12-16 NOTE — TELEPHONE ENCOUNTER
PT calling and was here in Sept for Annual and asked for STD testing also    Pt not sure if she was tested and her boyfriend tested positive for Gonorrhea    Please call PT

## 2020-12-30 ENCOUNTER — HOSPITAL ENCOUNTER (EMERGENCY)
Age: 30
Discharge: HOME | End: 2020-12-30
Attending: EMERGENCY MEDICINE
Payer: MEDICAID

## 2020-12-30 VITALS
BODY MASS INDEX: 28.68 KG/M2 | RESPIRATION RATE: 16 BRPM | OXYGEN SATURATION: 98 % | DIASTOLIC BLOOD PRESSURE: 77 MMHG | HEART RATE: 84 BPM | SYSTOLIC BLOOD PRESSURE: 111 MMHG | WEIGHT: 168 LBS | TEMPERATURE: 98 F | HEIGHT: 64 IN

## 2020-12-30 DIAGNOSIS — Z20.822 ENCOUNTER FOR LABORATORY TESTING FOR COVID-19 VIRUS: Primary | ICD-10-CM

## 2020-12-30 PROCEDURE — 99283 EMERGENCY DEPT VISIT LOW MDM: CPT

## 2020-12-30 NOTE — ED PROVIDER NOTES
Patient Seen in: [facility] [place]      History   Patient presents with:  Testing    Stated Complaint: testing for covid    HPI    26-year-old white female who comes emergency room today complaining of wants a Covid test.  Mother rub    Extremity exam reveals no clubbing cyanosis or edema. Patient has good radial pulses noted bilaterally in the upper extremities .     There are no rashes noted on skin exam.      ED Course     Labs Reviewed   SARS-COV-2 BY PCR ()

## 2020-12-31 LAB — SARS-COV-2 RNA RESP QL NAA+PROBE: NOT DETECTED

## 2021-01-19 ENCOUNTER — OFFICE VISIT (OUTPATIENT)
Dept: OBGYN CLINIC | Facility: CLINIC | Age: 31
End: 2021-01-19
Payer: MEDICAID

## 2021-01-19 VITALS
SYSTOLIC BLOOD PRESSURE: 116 MMHG | WEIGHT: 171.19 LBS | DIASTOLIC BLOOD PRESSURE: 70 MMHG | HEART RATE: 85 BPM | BODY MASS INDEX: 28.52 KG/M2 | HEIGHT: 65 IN

## 2021-01-19 DIAGNOSIS — Z12.4 CERVICAL CANCER SCREENING: ICD-10-CM

## 2021-01-19 DIAGNOSIS — Z01.419 WELL WOMAN EXAM WITH ROUTINE GYNECOLOGICAL EXAM: ICD-10-CM

## 2021-01-19 DIAGNOSIS — Z11.3 SCREEN FOR STD (SEXUALLY TRANSMITTED DISEASE): Primary | ICD-10-CM

## 2021-01-19 LAB
HAV IGM SER QL: NONREACTIVE
HBV CORE IGM SER QL: NONREACTIVE
HBV SURFACE AG SERPL QL IA: NONREACTIVE
HCV AB SERPL QL IA: NONREACTIVE
T PALLIDUM AB SER QL IA: NONREACTIVE

## 2021-01-19 PROCEDURE — 87491 CHLMYD TRACH DNA AMP PROBE: CPT | Performed by: NURSE PRACTITIONER

## 2021-01-19 PROCEDURE — 87591 N.GONORRHOEAE DNA AMP PROB: CPT | Performed by: NURSE PRACTITIONER

## 2021-01-19 PROCEDURE — 86780 TREPONEMA PALLIDUM: CPT | Performed by: NURSE PRACTITIONER

## 2021-01-19 PROCEDURE — 99213 OFFICE O/P EST LOW 20 MIN: CPT | Performed by: NURSE PRACTITIONER

## 2021-01-19 PROCEDURE — 3078F DIAST BP <80 MM HG: CPT | Performed by: NURSE PRACTITIONER

## 2021-01-19 PROCEDURE — 87389 HIV-1 AG W/HIV-1&-2 AB AG IA: CPT | Performed by: NURSE PRACTITIONER

## 2021-01-19 PROCEDURE — 3008F BODY MASS INDEX DOCD: CPT | Performed by: NURSE PRACTITIONER

## 2021-01-19 PROCEDURE — 80074 ACUTE HEPATITIS PANEL: CPT | Performed by: NURSE PRACTITIONER

## 2021-01-19 PROCEDURE — 3074F SYST BP LT 130 MM HG: CPT | Performed by: NURSE PRACTITIONER

## 2021-01-19 NOTE — PROGRESS NOTES
Gyne note     S: patient is a 31year old yo  here for STD screening. She denies any current concerns or symptoms. She was here in 2020 and GC/ CHl was ordered with her pap but for some reason her STD screening was not run.  She would like a full b

## 2021-01-21 LAB
C TRACH DNA SPEC QL NAA+PROBE: NEGATIVE
N GONORRHOEA DNA SPEC QL NAA+PROBE: NEGATIVE

## 2021-03-03 ENCOUNTER — OFFICE VISIT (OUTPATIENT)
Dept: NEUROLOGY | Facility: CLINIC | Age: 31
End: 2021-03-03
Payer: MEDICAID

## 2021-03-03 VITALS
RESPIRATION RATE: 16 BRPM | WEIGHT: 170 LBS | SYSTOLIC BLOOD PRESSURE: 110 MMHG | BODY MASS INDEX: 28 KG/M2 | DIASTOLIC BLOOD PRESSURE: 64 MMHG | HEART RATE: 80 BPM

## 2021-03-03 DIAGNOSIS — G43.009 MIGRAINE WITHOUT AURA AND WITHOUT STATUS MIGRAINOSUS, NOT INTRACTABLE: Primary | ICD-10-CM

## 2021-03-03 PROCEDURE — 3078F DIAST BP <80 MM HG: CPT | Performed by: OTHER

## 2021-03-03 PROCEDURE — 3074F SYST BP LT 130 MM HG: CPT | Performed by: OTHER

## 2021-03-03 PROCEDURE — 99213 OFFICE O/P EST LOW 20 MIN: CPT | Performed by: OTHER

## 2021-03-03 RX ORDER — TOPIRAMATE 50 MG/1
TABLET, FILM COATED ORAL
Qty: 180 TABLET | Refills: 3 | Status: SHIPPED | OUTPATIENT
Start: 2021-03-03 | End: 2021-11-01

## 2021-03-03 RX ORDER — VENLAFAXINE HYDROCHLORIDE 37.5 MG/1
37.5 CAPSULE, EXTENDED RELEASE ORAL DAILY
Qty: 90 CAPSULE | Refills: 3 | Status: SHIPPED | OUTPATIENT
Start: 2021-03-03 | End: 2021-11-01

## 2021-03-04 NOTE — PROGRESS NOTES
HPI:    Patient ID: Danna Santa is a 31year old female. Migraine   Pertinent negatives include no dizziness, nausea, numbness or photophobia. Headache   Pertinent negatives include no dizziness, nausea, numbness or photophobia.         Luis Alfredo Mary Negative for nausea. Endocrine: Negative. Genitourinary: Negative. Musculoskeletal: Negative. Skin: Negative. Allergic/Immunologic: Negative.     Neurological: Negative for dizziness, facial asymmetry, speech difficulty, numbness and headaches dose  Return to the clinic in 1 year or sooner if needed    See orders and medications filed with this encounter. The patient indicates understanding of these issues and agrees with the plan.       Mendel Morin, MD  [address] [facility]

## 2021-03-23 ENCOUNTER — TELEPHONE (OUTPATIENT)
Dept: OBGYN CLINIC | Facility: CLINIC | Age: 31
End: 2021-03-23

## 2021-03-23 NOTE — TELEPHONE ENCOUNTER
Patient called c/o irregular bleeding. She had Nexplanon inserted on 10/13/2020. She states she has had bleeding on and off since. She will go 2 weeks without bleeding, 1 week with, 3 weeks without, etc. It has been occurring since insertion.  Denies any ab

## 2021-03-23 NOTE — TELEPHONE ENCOUNTER
Pt called and stated she had nexplanon put in a few months ago and is experiencing a period every week. She said she would like to talk to a nurse to see if there is another option.  Please advise

## 2021-04-14 ENCOUNTER — OFFICE VISIT (OUTPATIENT)
Dept: OBGYN CLINIC | Facility: CLINIC | Age: 31
End: 2021-04-14
Payer: MEDICAID

## 2021-04-14 VITALS — SYSTOLIC BLOOD PRESSURE: 110 MMHG | BODY MASS INDEX: 29 KG/M2 | WEIGHT: 175 LBS | DIASTOLIC BLOOD PRESSURE: 70 MMHG

## 2021-04-14 DIAGNOSIS — Z30.46 ENCOUNTER FOR REMOVAL OF SUBDERMAL CONTRACEPTIVE IMPLANT: Primary | ICD-10-CM

## 2021-04-14 DIAGNOSIS — Z30.011 BCP (BIRTH CONTROL PILLS) INITIATION: ICD-10-CM

## 2021-04-14 DIAGNOSIS — Z30.09 BIRTH CONTROL COUNSELING: ICD-10-CM

## 2021-04-14 PROCEDURE — 3074F SYST BP LT 130 MM HG: CPT | Performed by: NURSE PRACTITIONER

## 2021-04-14 PROCEDURE — 3078F DIAST BP <80 MM HG: CPT | Performed by: NURSE PRACTITIONER

## 2021-04-14 PROCEDURE — 11982 REMOVE DRUG IMPLANT DEVICE: CPT | Performed by: NURSE PRACTITIONER

## 2021-04-14 PROCEDURE — 99213 OFFICE O/P EST LOW 20 MIN: CPT | Performed by: NURSE PRACTITIONER

## 2021-04-14 NOTE — PROGRESS NOTES
Gyne note     S: patient is a 36year old yo  here for removal of her Nexplanon device due to continued irregular bleeding. She is interested in alternate birth control options. She is currently taking Topamax 50 mg twice daily.  She is well controll

## 2021-04-27 ENCOUNTER — TELEPHONE (OUTPATIENT)
Dept: OBGYN CLINIC | Facility: CLINIC | Age: 31
End: 2021-04-27

## 2021-04-27 RX ORDER — SULFAMETHOXAZOLE AND TRIMETHOPRIM 800; 160 MG/1; MG/1
1 TABLET ORAL 2 TIMES DAILY
Qty: 10 TABLET | Refills: 0 | Status: SHIPPED | OUTPATIENT
Start: 2021-04-27 | End: 2021-11-01

## 2021-04-27 NOTE — TELEPHONE ENCOUNTER
35 y/o called c/o burning with urination and feeling like she has to urinate but then doesn't go. She denies any fever, back pain, or hematuria. She denies any allergies to medications. Last OV date: 04/14/2021  Recent Test/Labs: NA   Recommendations:  Charly

## 2021-04-27 NOTE — TELEPHONE ENCOUNTER
Patient states that she thinks she has a uti. She has burning with frequent urination.  Please call to advise

## 2021-08-02 ENCOUNTER — TELEPHONE (OUTPATIENT)
Dept: OBGYN CLINIC | Facility: CLINIC | Age: 31
End: 2021-08-02

## 2021-08-02 RX ORDER — SULFAMETHOXAZOLE AND TRIMETHOPRIM 800; 160 MG/1; MG/1
1 TABLET ORAL 2 TIMES DAILY
Qty: 10 TABLET | Refills: 0 | Status: SHIPPED | OUTPATIENT
Start: 2021-08-02 | End: 2021-08-07

## 2021-08-02 NOTE — TELEPHONE ENCOUNTER
Pt complains of UTI symptoms- burning with urination/ smell and frequency.      Pt called on-call over weekend no call back

## 2021-08-02 NOTE — TELEPHONE ENCOUNTER
Patient called c/o burning with urination, frequency. She has had one UTI previously that she remembers. Denies any fever or hematuria.    Last OV date: 04/14/2021  Recent Test/Labs: NA   Recommendations: Patient confirmed that she does not have any allergi

## 2021-11-01 ENCOUNTER — OFFICE VISIT (OUTPATIENT)
Dept: OBGYN CLINIC | Facility: CLINIC | Age: 31
End: 2021-11-01
Payer: MEDICAID

## 2021-11-01 VITALS
SYSTOLIC BLOOD PRESSURE: 106 MMHG | DIASTOLIC BLOOD PRESSURE: 70 MMHG | WEIGHT: 174.81 LBS | HEART RATE: 82 BPM | BODY MASS INDEX: 29 KG/M2

## 2021-11-01 DIAGNOSIS — Z01.419 WELL WOMAN EXAM WITH ROUTINE GYNECOLOGICAL EXAM: Primary | ICD-10-CM

## 2021-11-01 DIAGNOSIS — Z12.4 CERVICAL CANCER SCREENING: ICD-10-CM

## 2021-11-01 DIAGNOSIS — Z11.3 SCREEN FOR STD (SEXUALLY TRANSMITTED DISEASE): ICD-10-CM

## 2021-11-01 PROCEDURE — 99395 PREV VISIT EST AGE 18-39: CPT | Performed by: NURSE PRACTITIONER

## 2021-11-01 PROCEDURE — 3074F SYST BP LT 130 MM HG: CPT | Performed by: NURSE PRACTITIONER

## 2021-11-01 PROCEDURE — 3078F DIAST BP <80 MM HG: CPT | Performed by: NURSE PRACTITIONER

## 2021-11-01 NOTE — PROGRESS NOTES
Here for Routine Annual Exam  No concerns or questions. Menses are regular, no concern.   Contraception- never started oral contraception, anxious about interaction with Migraine medication  Lost her sister last month suddenly in a fatal car accident,

## 2021-11-02 ENCOUNTER — TELEPHONE (OUTPATIENT)
Dept: OBGYN CLINIC | Facility: CLINIC | Age: 31
End: 2021-11-02

## 2021-11-02 DIAGNOSIS — Z12.4 CERVICAL CANCER SCREENING: ICD-10-CM

## 2021-11-02 DIAGNOSIS — Z01.419 WELL WOMAN EXAM WITH ROUTINE GYNECOLOGICAL EXAM: Primary | ICD-10-CM

## 2021-11-02 DIAGNOSIS — Z11.3 SCREEN FOR STD (SEXUALLY TRANSMITTED DISEASE): ICD-10-CM

## 2021-11-02 PROCEDURE — 87491 CHLMYD TRACH DNA AMP PROBE: CPT | Performed by: NURSE PRACTITIONER

## 2021-11-02 PROCEDURE — 88175 CYTOPATH C/V AUTO FLUID REDO: CPT | Performed by: NURSE PRACTITIONER

## 2021-11-02 PROCEDURE — 87591 N.GONORRHOEAE DNA AMP PROB: CPT | Performed by: NURSE PRACTITIONER

## 2021-11-02 NOTE — TELEPHONE ENCOUNTER
Returned call to lab. They need order for [id number] and [id number]. Orders on file are not Edw test codes. Corrected orders placed.

## 2022-01-31 ENCOUNTER — TELEPHONE (OUTPATIENT)
Dept: SURGERY | Facility: CLINIC | Age: 32
End: 2022-01-31

## 2022-01-31 NOTE — TELEPHONE ENCOUNTER
Patient cancelled appointment for 2/3/22 via automated reminder, LVM to offer to reschedule. Please assist when call is returned.

## 2022-03-10 ENCOUNTER — OFFICE VISIT (OUTPATIENT)
Dept: FAMILY MEDICINE CLINIC | Facility: CLINIC | Age: 32
End: 2022-03-10
Payer: MEDICAID

## 2022-03-10 VITALS
HEIGHT: 65 IN | TEMPERATURE: 99 F | SYSTOLIC BLOOD PRESSURE: 122 MMHG | WEIGHT: 181 LBS | BODY MASS INDEX: 30.16 KG/M2 | DIASTOLIC BLOOD PRESSURE: 82 MMHG

## 2022-03-10 DIAGNOSIS — R63.5 WEIGHT GAIN: Primary | ICD-10-CM

## 2022-03-10 DIAGNOSIS — E66.9 OBESITY (BMI 30-39.9): ICD-10-CM

## 2022-03-10 PROCEDURE — 3008F BODY MASS INDEX DOCD: CPT | Performed by: FAMILY MEDICINE

## 2022-03-10 PROCEDURE — 3079F DIAST BP 80-89 MM HG: CPT | Performed by: FAMILY MEDICINE

## 2022-03-10 PROCEDURE — 3074F SYST BP LT 130 MM HG: CPT | Performed by: FAMILY MEDICINE

## 2022-03-10 PROCEDURE — 99215 OFFICE O/P EST HI 40 MIN: CPT | Performed by: FAMILY MEDICINE

## 2022-03-10 PROCEDURE — 99417 PROLNG OP E/M EACH 15 MIN: CPT | Performed by: FAMILY MEDICINE

## 2022-03-10 RX ORDER — PHENTERMINE HYDROCHLORIDE 37.5 MG/1
37.5 TABLET ORAL
Qty: 30 TABLET | Refills: 0 | Status: SHIPPED | OUTPATIENT
Start: 2022-03-10

## 2022-03-11 LAB — TSH W/REFLEX TO FT4: 1.45 MIU/L

## 2022-04-05 ENCOUNTER — OFFICE VISIT (OUTPATIENT)
Dept: FAMILY MEDICINE CLINIC | Facility: CLINIC | Age: 32
End: 2022-04-05
Payer: MEDICAID

## 2022-04-05 VITALS
TEMPERATURE: 99 F | DIASTOLIC BLOOD PRESSURE: 80 MMHG | HEIGHT: 65 IN | HEART RATE: 99 BPM | WEIGHT: 177.81 LBS | OXYGEN SATURATION: 100 % | BODY MASS INDEX: 29.62 KG/M2 | SYSTOLIC BLOOD PRESSURE: 100 MMHG

## 2022-04-05 DIAGNOSIS — R63.5 WEIGHT GAIN: Primary | ICD-10-CM

## 2022-04-05 PROCEDURE — 3079F DIAST BP 80-89 MM HG: CPT | Performed by: FAMILY MEDICINE

## 2022-04-05 PROCEDURE — 99214 OFFICE O/P EST MOD 30 MIN: CPT | Performed by: FAMILY MEDICINE

## 2022-04-05 PROCEDURE — 3008F BODY MASS INDEX DOCD: CPT | Performed by: FAMILY MEDICINE

## 2022-04-05 PROCEDURE — 3074F SYST BP LT 130 MM HG: CPT | Performed by: FAMILY MEDICINE

## 2022-04-05 RX ORDER — PHENTERMINE HYDROCHLORIDE 37.5 MG/1
37.5 TABLET ORAL
Qty: 30 TABLET | Refills: 2 | Status: SHIPPED | OUTPATIENT
Start: 2022-04-05

## 2022-07-11 ENCOUNTER — TELEPHONE (OUTPATIENT)
Dept: FAMILY MEDICINE CLINIC | Facility: CLINIC | Age: 32
End: 2022-07-11

## 2022-07-11 NOTE — TELEPHONE ENCOUNTER
I do not recommend her taking the Topiramate with her hx of kidney stones. There is nothing that I would use it in conjunction with to prevent stone formation.   Thanks

## 2022-07-11 NOTE — TELEPHONE ENCOUNTER
Pt called she used to take     topiramate 50 MG Oral Tab [[id number]]      She stopped taking because she got a kidney stone. Pt wants to know if there is something that she can take to prevent kidney stones from happening again while taking medication?

## 2022-07-14 ENCOUNTER — OFFICE VISIT (OUTPATIENT)
Dept: FAMILY MEDICINE CLINIC | Facility: CLINIC | Age: 32
End: 2022-07-14
Payer: MEDICAID

## 2022-07-14 VITALS
DIASTOLIC BLOOD PRESSURE: 76 MMHG | OXYGEN SATURATION: 100 % | HEART RATE: 90 BPM | TEMPERATURE: 98 F | SYSTOLIC BLOOD PRESSURE: 122 MMHG | BODY MASS INDEX: 28 KG/M2 | WEIGHT: 167 LBS

## 2022-07-14 DIAGNOSIS — E66.9 OBESITY (BMI 30-39.9): ICD-10-CM

## 2022-07-14 DIAGNOSIS — G43.009 MIGRAINE WITHOUT AURA AND WITHOUT STATUS MIGRAINOSUS, NOT INTRACTABLE: Primary | ICD-10-CM

## 2022-07-14 PROCEDURE — 3074F SYST BP LT 130 MM HG: CPT | Performed by: FAMILY MEDICINE

## 2022-07-14 PROCEDURE — 3078F DIAST BP <80 MM HG: CPT | Performed by: FAMILY MEDICINE

## 2022-07-14 PROCEDURE — 99214 OFFICE O/P EST MOD 30 MIN: CPT | Performed by: FAMILY MEDICINE

## 2022-07-14 RX ORDER — AMITRIPTYLINE HYDROCHLORIDE 25 MG/1
25 TABLET, FILM COATED ORAL NIGHTLY
Qty: 90 TABLET | Refills: 0 | Status: SHIPPED | OUTPATIENT
Start: 2022-07-14

## 2022-07-14 RX ORDER — RIMEGEPANT SULFATE 75 MG/75MG
1 TABLET, ORALLY DISINTEGRATING ORAL
Qty: 30 TABLET | Refills: 0 | Status: SHIPPED | OUTPATIENT
Start: 2022-07-14 | End: 2022-08-13

## 2022-07-14 RX ORDER — PHENTERMINE HYDROCHLORIDE 37.5 MG/1
37.5 CAPSULE ORAL EVERY MORNING
Qty: 30 CAPSULE | Refills: 2 | Status: SHIPPED | OUTPATIENT
Start: 2022-07-14

## 2022-07-15 ENCOUNTER — TELEPHONE (OUTPATIENT)
Dept: FAMILY MEDICINE CLINIC | Facility: CLINIC | Age: 32
End: 2022-07-15

## 2022-07-15 NOTE — TELEPHONE ENCOUNTER
Patient advised of that insurance denied the [facility]. Patient advised to go on [employer] and there is a section that she can go and get a coupon for the Nurtec. Cost could be as low as $0. Patient appreciated the information. Tracking # [id number].

## 2022-08-16 ENCOUNTER — OFFICE VISIT (OUTPATIENT)
Dept: FAMILY MEDICINE CLINIC | Facility: CLINIC | Age: 32
End: 2022-08-16
Payer: MEDICAID

## 2022-08-16 VITALS
DIASTOLIC BLOOD PRESSURE: 80 MMHG | BODY MASS INDEX: 28 KG/M2 | HEART RATE: 86 BPM | SYSTOLIC BLOOD PRESSURE: 114 MMHG | WEIGHT: 171 LBS | OXYGEN SATURATION: 99 % | TEMPERATURE: 98 F

## 2022-08-16 DIAGNOSIS — G43.009 MIGRAINE WITHOUT AURA AND WITHOUT STATUS MIGRAINOSUS, NOT INTRACTABLE: Primary | ICD-10-CM

## 2022-08-16 DIAGNOSIS — R63.5 WEIGHT GAIN: ICD-10-CM

## 2022-08-16 PROCEDURE — 3079F DIAST BP 80-89 MM HG: CPT | Performed by: FAMILY MEDICINE

## 2022-08-16 PROCEDURE — 99214 OFFICE O/P EST MOD 30 MIN: CPT | Performed by: FAMILY MEDICINE

## 2022-08-16 PROCEDURE — 3074F SYST BP LT 130 MM HG: CPT | Performed by: FAMILY MEDICINE

## (undated) NOTE — MR AVS SNAPSHOT
After Visit Summary   1/19/2021    Rigoberto Vizcarra   MRN: [id number]           Visit Information     Date & Time  1/19/2021 12:30 PM Provider  [facility], [address] [place] [address] [place], [place] Dept.  Phone  63 (Approximate) 1/19/2022    HIV AG AB COMBO [QKJ2729 CUSTOM]  1/19/2021 (Approximate) 1/19/2022    T PALLIDUM SCREENING CASCADE [3892212 CPT(R)]  1/19/2021 (Approximate) 1/19/2022      Future Appointments        Provider Department    11/24/2021 11:40 AM Mu

## (undated) NOTE — MR AVS SNAPSHOT
[address] [place] [id number] [phone]               Thank you for choosing us for your health care visit with Mike Limon DO.   We are glad to serve you and happy to provide you with this summary of your vi Visit [email or web address] to learn more.            Visit [facility] online at  [email or web address]

## (undated) NOTE — MR AVS SNAPSHOT
[address] [facility] [address] [phone]               Thank you for choosing us for your health care visit with Anastasia Garcia DO.   We are glad to serve you and happy to provide you with this summary of your vi Healthy Diet and Regular Exercise  [employer] [facility] [employer] for making healthy food choices  -   Enjoy your food, but eat less. Fully enjoy your food when eating. Don’t eat while distracted and slow down. Avoid over sized portions.    Shital Santiago

## (undated) NOTE — MR AVS SNAPSHOT
[address] [facility] [address] [phone]               Thank you for choosing us for your health care visit with Harshil Aragon DO.   We are glad to serve you and happy to provide you with this summary of your vi physician or the clinic staff will provide you with the specialist information so you can schedule your appointment. Please wait 3 working days to schedule your appointment unless notified.     To schedules an appointment with an outpatient dietitiantrevor

## (undated) NOTE — MR AVS SNAPSHOT
After Visit Summary   11/1/2021    Kassi Peña   MRN: [id number]           Visit Information     Date & Time  11/1/2021 10:00 AM Provider  [facility] [place] [facility]  Stephanie Mike, [address] [place], [place] [address]  Dept.  Phone experience and are looking for ways to make improvements. Your feedback will help us do so. For more information on [facility], please visit [email or web address]         No text in SmartText       No text in SmartText

## (undated) NOTE — MR AVS SNAPSHOT
[address] [place] [id number] [phone]               Thank you for choosing us for your health care visit with Willy Walker DO.   We are glad to serve you and happy to provide you with this summary of your vi

## (undated) NOTE — MR AVS SNAPSHOT
[address] [place], [place] [id number] [phone]               Thank you for choosing us for your health care visit with Silvio Campbell MD.  We are glad to serve you and happy to provide you with this summary of your

## (undated) NOTE — LETTER
Haleigh Anne, :3/16/1990    CONSENT FOR PROCEDURE/SEDATION    1. I authorize the performance upon Haleigh Anne  the following: Nexplanon Insertion    2.  I authorize Dr. Marlyce Claude, APN (and whomever is designated as the [id number] Relationship to patient: ____________________________________________    Witness: _________________________________________ Date:___________     Physician Signature: _______________________________ Date:___________

## (undated) NOTE — MR AVS SNAPSHOT
After Visit Summary   9/4/2020    Elle Navas    MRN: [id number]           Visit Information     Date & Time  9/4/2020  9:30 AM Provider  [facility]  [facility] [place]  Dept.  Phone messages to your doctor, view test results, renew prescriptions and request appointments. How Do I Sign Up? 1. In your Internet browser, go to [email or web address]  2.  Click on the Activate your Account if you have an activation code i experience and are looking for ways to make improvements. Your feedback will help us do so. For more information on [employer], please visit [email or web address]                   DO YOU KNOW WHERE TO GO?   Injury & Illness are neve For more information about hours, locations or appointment options available at [facility],   visit [email or web address] or call [phone] ([phone])

## (undated) NOTE — LETTER
Carson Hollis, :3/16/1990    CONSENT FOR PROCEDURE/SEDATION    1. I authorize the performance upon Carson Hollis  the following: Removal only Nexplanon    2.  I authorize [facility] Doe (and whomever is designated as the doct ____________________________________________    Witness: _________________________________________ Date:___________     Physician Signature: _______________________________ Date:___________